# Patient Record
(demographics unavailable — no encounter records)

---

## 2025-07-02 NOTE — HISTORY OF PRESENT ILLNESS
[de-identified] : 19 y/o male here with mother for evaluation.   c/o brain fog feeling and floating feeling x 1 year.   3 weeks ago, also started using Triple Strength Fish oil (Omega 3) EPA and DHA, Heart healthy for his brain fog symptoms with probiotics - increase in brain fog, numbness of face, hand and feet, blurry vision, increase in fatigue. Pt stopped using fish oil with help.  Has had fish since with no issues.   c/o nasal congestion, runny nose, sneezing, PND, dry cough, at times itchy and watery eyes for many years. Sx are worse in spring, summer and fall. Uses Zyrtec 10mg QD with help. Last dose of Zyrtec was yesterday.   Due to his symptoms PCP did bloodwork - CBC normal, , CRP/ESR normal, Immunoglobulin normal, Aeroallergnens - pos to tree, grass and weed pollen, mild pos to dog. Low TSH 0.441 with normal T3 and T4 and Low Vitamin D at 25.3 (PCP aware). [place] Spotted Fever IgM pos (pt was treated with Doxycycline), IgG normal, IBD Panel - pANCA normal, elevated S. Cerevisiae IgG and IgA.  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 Pt came home and relaxed and by nighttime was feeling better. This is first time pt had this type of episode. 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   Hx of cefdinir allergy: In 2017 pt was given cefdinir for ?ear/sinus infection. After 1st dose pt had high fever and vomiting. Avoiding since.  Ok with PCN antibiotics

## 2025-07-02 NOTE — SOCIAL HISTORY
[House] : [unfilled] lives in a house  [Central Forced Air] : heating provided by central forced air [Central] : air conditioning provided by central unit [Dog] : dog [Cat] : cat [Dust Mite Covers] : does not have dust mite covers [Feather Pillows] : does not have feather pillows [Feather Comforter] : does not have a feather comforter [Bedroom] : not in the bedroom [Living Area] : not in the living area [Smokers in Household] : there are no smokers in the home [de-identified] : area kelvins [de-identified] : 1 dog and 1 cat

## 2025-07-02 NOTE — REVIEW OF SYSTEMS
[Nasal Congestion] : nasal congestion [Post Nasal Drip] : post nasal drip [Nl] : Integumentary [Rhinorrhea] : no rhinorrhea [Snoring] : no snoring [Nasal Itching] : no nasal itching [Throat Itching] : no throat itching [Sneezing] : no sneezing [FreeTextEntry2] : c/o brain fog feeling [FreeTextEntry6] : ? chest tightness when breathing heavy

## 2025-07-02 NOTE — REASON FOR VISIT
[Evaluation/Consultation] : an evaluation/consultation of [Allergic Rhinitis] : allergic rhinitis [Parent] : parent [FreeTextEntry3] : c/o brain fog feeling

## 2025-07-02 NOTE — PHYSICAL EXAM
[Alert] : alert [No Acute Distress] : no acute distress [Conjunctival Erythema] : no conjunctival erythema [Normal Lips/Tongue] : the lips and tongue were normal [Normal Outer Ear/Nose] : the ears and nose were normal in appearance [Pale mucosa] : no pale mucosa [Boggy Nasal Turbinates] : no boggy and/or pale nasal turbinates [Pharyngeal erythema] : no pharyngeal erythema [Posterior Pharyngeal Cobblestoning] : posterior pharyngeal cobblestoning [Clear Rhinorrhea] : no clear rhinorrhea was seen [Supple] : the neck was supple [Normal Rate and Effort] : normal respiratory rhythm and effort [Wheezing] : no wheezing was heard [Normal Rate] : heart rate was normal  [Skin Intact] : skin intact  [de-identified] : Left ear - cerumen noted, TM not visualized, Right ear - normal

## 2025-07-02 NOTE — HISTORY OF PRESENT ILLNESS
[de-identified] : 21 y/o male here with mother for evaluation.   c/o brain fog feeling and floating feeling x 1 year.   3 weeks ago, also started using Triple Strength Fish oil (Omega 3) EPA and DHA, Heart healthy for his brain fog symptoms with probiotics - increase in brain fog, numbness of face, hand and feet, blurry vision, increase in fatigue. Pt stopped using fish oil with help.  Has had fish since with no issues.   c/o nasal congestion, runny nose, sneezing, PND, dry cough, at times itchy and watery eyes for many years. Sx are worse in spring, summer and fall. Uses Zyrtec 10mg QD with help. Last dose of Zyrtec was yesterday.   Due to his symptoms PCP did bloodwork - CBC normal, , CRP/ESR normal, Immunoglobulin normal, Aeroallergnens - pos to tree, grass and weed pollen, mild pos to dog. Low TSH 0.441 with normal T3 and T4 and Low Vitamin D at 25.3 (PCP aware). [place] Spotted Fever IgM pos (pt was treated with Doxycycline), IgG normal, IBD Panel - pANCA normal, elevated S. Cerevisiae IgG and IgA.  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 Pt came home and relaxed and by nighttime was feeling better. This is first time pt had this type of episode. 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   Hx of cefdinir allergy: In 2017 pt was given cefdinir for ?ear/sinus infection. After 1st dose pt had high fever and vomiting. Avoiding since.  Ok with PCN antibiotics

## 2025-07-02 NOTE — PHYSICAL EXAM
[Alert] : alert [No Acute Distress] : no acute distress [Conjunctival Erythema] : no conjunctival erythema [Normal Lips/Tongue] : the lips and tongue were normal [Normal Outer Ear/Nose] : the ears and nose were normal in appearance [Pale mucosa] : no pale mucosa [Boggy Nasal Turbinates] : no boggy and/or pale nasal turbinates [Pharyngeal erythema] : no pharyngeal erythema [Posterior Pharyngeal Cobblestoning] : posterior pharyngeal cobblestoning [Clear Rhinorrhea] : no clear rhinorrhea was seen [Supple] : the neck was supple [Normal Rate and Effort] : normal respiratory rhythm and effort [Wheezing] : no wheezing was heard [Normal Rate] : heart rate was normal  [Skin Intact] : skin intact  [de-identified] : Left ear - cerumen noted, TM not visualized, Right ear - normal

## 2025-07-02 NOTE — ASSESSMENT
[FreeTextEntry1] : 20-year-old male here for evaluation with mother.  Bloodwork done by PCP reviewed with pt and mother.   Allergic rhinitis to pollen and dog:  Continue to use Zyrtec as needed and start using Flonase 1 spray in each nostril daily.  Proper technique of using nasal spray was discussed.  If patient develops nosebleeds stop the nasal spray and call back. Allergies can cause brain fog.  Patient advised to see how he is feeling on allergy medications.  Complaining of brain fog: Will check for DAK[place]  Symptoms after taking triple strength fish oil - avoid for now.  Patient tolerating fish without any issues therefore continue.  Elevated [place] spotted fever IgM: Patient was treated with doxycycline by primary care doctor.  Will recheck IgM/IgG for [place] spotted fever.  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   Low TSH with normal T3 and T4 - ?Subclinical Hyperthyroidism. Advised pt to follow up with PCP and recheck TSH level.   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 Spirometry was done and it was within normal limits. Use albuterol 2 puffs every 4-6 hours as needed.  Refill sent.  Adverse effects to cefdinir: Mom prefers to avoid cefdinir for now.  Consider skin test if interested.  Child okay with penicillin class antibiotics therefore continue.  Will call back with results   Total time spent 55 minutes on the date of the encounter. This included time devoted to preparing to see the patient with review of previous medical record, obtaining medical history, performing physical exam, counseling and patient education with patient and family, ordering medications and lab studies, documentation in the medical record and coordination of care. The time spent is separate from time spent on separately billed procedures.

## 2025-07-02 NOTE — ASSESSMENT
[FreeTextEntry1] : 20-year-old male here for evaluation with mother.  Bloodwork done by PCP reviewed with pt and mother.   Allergic rhinitis to pollen and dog:  Continue to use Zyrtec as needed and start using Flonase 1 spray in each nostril daily.  Proper technique of using nasal spray was discussed.  If patient develops nosebleeds stop the nasal spray and call back. Allergies can cause brain fog.  Patient advised to see how he is feeling on allergy medications.  Complaining of brain fog: Will check for DAK[place]  Symptoms after taking triple strength fish oil - avoid for now.  Patient tolerating fish without any issues therefore continue.  Elevated [place] spotted fever IgM: Patient was treated with doxycycline by primary care doctor.  Will recheck IgM/IgG for [place] spotted fever.  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   Low TSH with normal T3 and T4 - ?Subclinical Hyperthyroidism. Advised pt to follow up with PCP and recheck TSH level.   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 Spirometry was done and it was within normal limits. Use albuterol 2 puffs every 4-6 hours as needed.  Refill sent.  Adverse effects to cefdinir: Mom prefers to avoid cefdinir for now.  Consider skin test if interested.  Child okay with penicillin class antibiotics therefore continue.  Will call back with results   Total time spent 55 minutes on the date of the encounter. This included time devoted to preparing to see the patient with review of previous medical record, obtaining medical history, performing physical exam, counseling and patient education with patient and family, ordering medications and lab studies, documentation in the medical record and coordination of care. The time spent is separate from time spent on separately billed procedures.